# Patient Record
Sex: MALE | Race: WHITE | Employment: FULL TIME
[De-identification: names, ages, dates, MRNs, and addresses within clinical notes are randomized per-mention and may not be internally consistent; named-entity substitution may affect disease eponyms.]

---

## 2017-06-17 ENCOUNTER — HEALTH MAINTENANCE LETTER (OUTPATIENT)
Age: 22
End: 2017-06-17

## 2018-08-25 ENCOUNTER — HEALTH MAINTENANCE LETTER (OUTPATIENT)
Age: 23
End: 2018-08-25

## 2020-04-14 ENCOUNTER — TELEPHONE (OUTPATIENT)
Dept: BEHAVIORAL HEALTH | Facility: CLINIC | Age: 25
End: 2020-04-14

## 2020-04-14 ENCOUNTER — HOSPITAL ENCOUNTER (INPATIENT)
Facility: CLINIC | Age: 25
LOS: 1 days | Discharge: HOME | End: 2020-04-15
Attending: EMERGENCY MEDICINE | Admitting: PSYCHIATRY & NEUROLOGY
Payer: COMMERCIAL

## 2020-04-14 DIAGNOSIS — F30.9 MANIA (H): ICD-10-CM

## 2020-04-14 DIAGNOSIS — F29 PSYCHOSIS, UNSPECIFIED PSYCHOSIS TYPE (H): Primary | ICD-10-CM

## 2020-04-14 LAB
ALBUMIN SERPL-MCNC: 4.5 G/DL (ref 3.4–5)
ALP SERPL-CCNC: 62 U/L (ref 40–150)
ALT SERPL W P-5'-P-CCNC: 24 U/L (ref 0–70)
AMPHETAMINES UR QL SCN: NEGATIVE
ANION GAP SERPL CALCULATED.3IONS-SCNC: 6 MMOL/L (ref 3–14)
APAP SERPL-MCNC: <2 MG/L (ref 10–20)
AST SERPL W P-5'-P-CCNC: 19 U/L (ref 0–45)
BARBITURATES UR QL: NEGATIVE
BASOPHILS # BLD AUTO: 0 10E9/L (ref 0–0.2)
BASOPHILS NFR BLD AUTO: 0.2 %
BENZODIAZ UR QL: NEGATIVE
BILIRUB SERPL-MCNC: 0.6 MG/DL (ref 0.2–1.3)
BUN SERPL-MCNC: 17 MG/DL (ref 7–30)
CALCIUM SERPL-MCNC: 9.4 MG/DL (ref 8.5–10.1)
CANNABINOIDS UR QL SCN: POSITIVE
CHLORIDE SERPL-SCNC: 109 MMOL/L (ref 94–109)
CO2 SERPL-SCNC: 26 MMOL/L (ref 20–32)
COCAINE UR QL: NEGATIVE
CREAT SERPL-MCNC: 1 MG/DL (ref 0.66–1.25)
DIFFERENTIAL METHOD BLD: ABNORMAL
EOSINOPHIL # BLD AUTO: 0 10E9/L (ref 0–0.7)
EOSINOPHIL NFR BLD AUTO: 0.2 %
ERYTHROCYTE [DISTWIDTH] IN BLOOD BY AUTOMATED COUNT: 12.6 % (ref 10–15)
GFR SERPL CREATININE-BSD FRML MDRD: 79 ML/MIN/{1.73_M2}
GLUCOSE SERPL-MCNC: 94 MG/DL (ref 70–99)
HCT VFR BLD AUTO: 40 % (ref 40–53)
HGB BLD-MCNC: 13.9 G/DL (ref 13.3–17.7)
IMM GRANULOCYTES # BLD: 0 10E9/L (ref 0–0.4)
IMM GRANULOCYTES NFR BLD: 0.2 %
LYMPHOCYTES # BLD AUTO: 2.3 10E9/L (ref 0.8–5.3)
LYMPHOCYTES NFR BLD AUTO: 18.8 %
MCH RBC QN AUTO: 28.5 PG (ref 26.5–33)
MCHC RBC AUTO-ENTMCNC: 34.8 G/DL (ref 31.5–36.5)
MCV RBC AUTO: 82 FL (ref 78–100)
MONOCYTES # BLD AUTO: 1.3 10E9/L (ref 0–1.3)
MONOCYTES NFR BLD AUTO: 10.9 %
NEUTROPHILS # BLD AUTO: 8.5 10E9/L (ref 1.6–8.3)
NEUTROPHILS NFR BLD AUTO: 69.7 %
NRBC # BLD AUTO: 0 10*3/UL
NRBC BLD AUTO-RTO: 0 /100
OPIATES UR QL SCN: NEGATIVE
PCP UR QL SCN: NEGATIVE
PLATELET # BLD AUTO: 268 10E9/L (ref 150–450)
POTASSIUM SERPL-SCNC: 3.6 MMOL/L (ref 3.4–5.3)
PROT SERPL-MCNC: 7.6 G/DL (ref 6.8–8.8)
RBC # BLD AUTO: 4.88 10E12/L (ref 4.4–5.9)
SODIUM SERPL-SCNC: 141 MMOL/L (ref 133–144)
WBC # BLD AUTO: 12.2 10E9/L (ref 4–11)

## 2020-04-14 PROCEDURE — 90791 PSYCH DIAGNOSTIC EVALUATION: CPT

## 2020-04-14 PROCEDURE — 80329 ANALGESICS NON-OPIOID 1 OR 2: CPT | Performed by: EMERGENCY MEDICINE

## 2020-04-14 PROCEDURE — 80053 COMPREHEN METABOLIC PANEL: CPT | Performed by: EMERGENCY MEDICINE

## 2020-04-14 PROCEDURE — 85025 COMPLETE CBC W/AUTO DIFF WBC: CPT | Performed by: EMERGENCY MEDICINE

## 2020-04-14 PROCEDURE — 99285 EMERGENCY DEPT VISIT HI MDM: CPT | Mod: 25

## 2020-04-14 PROCEDURE — 25000132 ZZH RX MED GY IP 250 OP 250 PS 637: Performed by: EMERGENCY MEDICINE

## 2020-04-14 PROCEDURE — 80307 DRUG TEST PRSMV CHEM ANLYZR: CPT | Performed by: EMERGENCY MEDICINE

## 2020-04-14 PROCEDURE — 84443 ASSAY THYROID STIM HORMONE: CPT | Performed by: EMERGENCY MEDICINE

## 2020-04-14 RX ORDER — OLANZAPINE 10 MG/1
10 TABLET, ORALLY DISINTEGRATING ORAL ONCE
Status: COMPLETED | OUTPATIENT
Start: 2020-04-14 | End: 2020-04-14

## 2020-04-14 RX ORDER — GABAPENTIN 100 MG/1
100 CAPSULE ORAL 3 TIMES DAILY
Status: ON HOLD | COMMUNITY
End: 2020-04-15

## 2020-04-14 RX ADMIN — OLANZAPINE 10 MG: 10 TABLET, ORALLY DISINTEGRATING ORAL at 22:10

## 2020-04-15 VITALS
SYSTOLIC BLOOD PRESSURE: 120 MMHG | OXYGEN SATURATION: 100 % | DIASTOLIC BLOOD PRESSURE: 76 MMHG | HEART RATE: 83 BPM | TEMPERATURE: 97.6 F | RESPIRATION RATE: 18 BRPM

## 2020-04-15 PROBLEM — F29 PSYCHOSIS (H): Status: ACTIVE | Noted: 2020-04-15

## 2020-04-15 LAB — TSH SERPL DL<=0.005 MIU/L-ACNC: 0.43 MU/L (ref 0.4–4)

## 2020-04-15 PROCEDURE — 25000132 ZZH RX MED GY IP 250 OP 250 PS 637: Performed by: PSYCHIATRY & NEUROLOGY

## 2020-04-15 PROCEDURE — 99207 ZZC CDG-MDM COMPONENT: MEETS LOW - DOWN CODED: CPT | Performed by: PSYCHIATRY & NEUROLOGY

## 2020-04-15 PROCEDURE — 99235 HOSP IP/OBS SAME DATE MOD 70: CPT | Performed by: PSYCHIATRY & NEUROLOGY

## 2020-04-15 PROCEDURE — 25000132 ZZH RX MED GY IP 250 OP 250 PS 637: Performed by: EMERGENCY MEDICINE

## 2020-04-15 PROCEDURE — 99222 1ST HOSP IP/OBS MODERATE 55: CPT | Performed by: NURSE PRACTITIONER

## 2020-04-15 PROCEDURE — 12400000 ZZH R&B MH

## 2020-04-15 RX ORDER — OLANZAPINE 10 MG/1
10 TABLET, ORALLY DISINTEGRATING ORAL ONCE
Status: COMPLETED | OUTPATIENT
Start: 2020-04-15 | End: 2020-04-15

## 2020-04-15 RX ORDER — ACETAMINOPHEN 325 MG/1
650 TABLET ORAL EVERY 6 HOURS PRN
Status: DISCONTINUED | OUTPATIENT
Start: 2020-04-15 | End: 2020-04-15 | Stop reason: HOSPADM

## 2020-04-15 RX ORDER — LORAZEPAM 1 MG/1
1 TABLET ORAL ONCE
Status: COMPLETED | OUTPATIENT
Start: 2020-04-15 | End: 2020-04-15

## 2020-04-15 RX ORDER — DULOXETIN HYDROCHLORIDE 20 MG/1
20 CAPSULE, DELAYED RELEASE ORAL DAILY
Status: ON HOLD | COMMUNITY
End: 2020-04-15

## 2020-04-15 RX ORDER — OLANZAPINE 10 MG/2ML
10 INJECTION, POWDER, FOR SOLUTION INTRAMUSCULAR
Status: DISCONTINUED | OUTPATIENT
Start: 2020-04-15 | End: 2020-04-15 | Stop reason: HOSPADM

## 2020-04-15 RX ORDER — QUETIAPINE FUMARATE 25 MG/1
25 TABLET, FILM COATED ORAL EVERY 4 HOURS PRN
Status: DISCONTINUED | OUTPATIENT
Start: 2020-04-15 | End: 2020-04-15 | Stop reason: HOSPADM

## 2020-04-15 RX ORDER — GABAPENTIN 300 MG/1
300 CAPSULE ORAL 2 TIMES DAILY
COMMUNITY

## 2020-04-15 RX ORDER — OLANZAPINE 10 MG/1
10 TABLET ORAL AT BEDTIME
Qty: 30 TABLET | Refills: 0 | Status: SHIPPED | OUTPATIENT
Start: 2020-04-15

## 2020-04-15 RX ORDER — OLANZAPINE 10 MG/1
10 TABLET, ORALLY DISINTEGRATING ORAL ONCE
Status: DISCONTINUED | OUTPATIENT
Start: 2020-04-15 | End: 2020-04-15 | Stop reason: HOSPADM

## 2020-04-15 RX ORDER — WATER 10 ML/10ML
INJECTION INTRAMUSCULAR; INTRAVENOUS; SUBCUTANEOUS
Status: DISCONTINUED
Start: 2020-04-15 | End: 2020-04-15 | Stop reason: WASHOUT

## 2020-04-15 RX ADMIN — LORAZEPAM 1 MG: 1 TABLET ORAL at 00:34

## 2020-04-15 RX ADMIN — OLANZAPINE 10 MG: 10 TABLET, ORALLY DISINTEGRATING ORAL at 01:32

## 2020-04-15 ASSESSMENT — ACTIVITIES OF DAILY LIVING (ADL)
DRESS: INDEPENDENT
TRANSFERRING: 0-->INDEPENDENT
BATHING: 0-->INDEPENDENT
TOILETING: 0-->INDEPENDENT
RETIRED_EATING: 0-->INDEPENDENT
ORAL_HYGIENE: INDEPENDENT
COGNITION: 0 - NO COGNITION ISSUES REPORTED
RETIRED_COMMUNICATION: 0-->UNDERSTANDS/COMMUNICATES WITHOUT DIFFICULTY
LAUNDRY: WITH SUPERVISION
SWALLOWING: 0-->SWALLOWS FOODS/LIQUIDS WITHOUT DIFFICULTY
DRESS: 0-->INDEPENDENT
HYGIENE/GROOMING: INDEPENDENT
FALL_HISTORY_WITHIN_LAST_SIX_MONTHS: NO
AMBULATION: 0-->INDEPENDENT

## 2020-04-15 ASSESSMENT — ENCOUNTER SYMPTOMS
RHINORRHEA: 0
VOMITING: 0
DIARRHEA: 0
FEVER: 0
COUGH: 0
APPETITE CHANGE: 1
AGITATION: 1
POLYDIPSIA: 0
FATIGUE: 0
SORE THROAT: 0
SHORTNESS OF BREATH: 0
CHILLS: 0

## 2020-04-15 NOTE — ED NOTES
"Per family the pt started rib pain 9 weeks ago, he was prescribed Cymbalta 20mg for nerve pain. He has no mental health history but has been participating in talk therapy. 72 hours ago saw a \"shrink\" who changed the medication, stopped it cold turkey and changed to Gabapentin 3 times a day. Tried to run away in triage. Loss of appetite, prior to sleeping 8 hours last night he has not slept in days. Aggressive outbursts which is totally abnormal for him. Smokes weed, no other drug use.  "
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
## 2020-04-15 NOTE — PLAN OF CARE
patient spent most of the shift in his room napping. His speech is normal in rate and content. His mood is a bit restless. Affect is calm. Denies SI/SIB and HI. Discharge information was given to the patient and gone over with him. Questions answered. Belongings returned to patient. He was escorted to the discharge door to meet his ride.

## 2020-04-15 NOTE — ED NOTES
"Pt refuses to take zyprexa ODT, states \"it won't help at all\", pt very belligerent with staff yelling at them, saying that he wants to leave.  Pt says that he will take oral ativan.  1mg Po ativan given.  Pt still verbally aggressive, when ativan is brought in, states that he won't take it.  Pt told that if he doesn't take pill, he will need to have an injection given.  Pt then consents to take pill.  Will continue to monitor.  "

## 2020-04-15 NOTE — PROGRESS NOTES
"Patient admitted to unit, was highly agitated. Patient refused admission vitals. Patient was pacing around, yelling, stating he was mad he was on a hold. Writer called oncall provider and got a 1 time order for zyprexa 10mg. Patient took the medication and was able to fall asleep.     Patient admitted from our ED, on 72 hour hold. Recently was started on Cymbalta 1 month and then stopped 4 days ago. Has been having difficulty sleeping. Utox was positive for cannabis.  Patient reports having racing thoughts. Patient denies SI, but was making statements when he arrived on unit that\" he would rather die then be here\".     Welcome packet reviewed with patient. Information reviewed includes getting emergency help, preventing infections, understanding your care, using medication safely, reducing falls, preventing pressure ulcers, smoking cessation, powerful choices and Patients Bill of Rights. Pt. given tour of the unit and instruction on use of facility including emergency call light. Program schedule reviewed with patient. Questions regarding the unit addressed. Pt. Search completed and belongings inventoried. Nursing assessment complete including patient and medication profiles. Risk assessments completed addressing suicide,fall,skin,nutrition and safety issues. Care plan initiated. Assessments reviewed with physician and admit orders received. Video monitoring in progress, Patient Informed.     "

## 2020-04-15 NOTE — PHARMACY-ADMISSION MEDICATION HISTORY
Pharmacy Medication History  Admission medication history interview status for the 4/14/2020  admission is complete. See EPIC admission navigator for prior to admission medications     Medication history sources: Patient, Surescripts and Care Everywhere  Medication history source reliability: Good  Adherence assessment: Moderate    Significant changes made to the medication list:  Pt states he is not on Zanaflex, Valtrex, Gabapentin 100mg, Alprazolam; also says he is not on Duloxetine but per notes his last dose was 4 days ago so added on list, recently filled 4/13      Additional medication history information:       Medication reconciliation completed by provider prior to medication history? No    Time spent in this activity: 15 min      Prior to Admission medications    Medication Sig Last Dose Taking? Auth Provider   DULoxetine (CYMBALTA) 20 MG capsule Take 20 mg by mouth daily Past Week at Unknown time Yes Unknown, Entered By History   gabapentin (NEURONTIN) 300 MG capsule Take 300 mg by mouth 2 times daily 4/14/2020 at Unknown time Yes Unknown, Entered By History

## 2020-04-15 NOTE — ED NOTES
"Spoke with pt's father over the phone, he reiterates the story about pt seeing the psychiatrist on Monday, who told him to stop taking Cymbalta. Father states ever since the rib pain started \"this has all been a nightmare\", and also reports pt smoked some pot tonight, and that pt had an outburst last week after smoking marijuana. I updated father on plan of care to be assessed by DEC, and told him I would give him a call when we have a plan.  "

## 2020-04-15 NOTE — ED NOTES
Pt requesting to leave.  States that he does not need to be here.  Nurse educates patient about need for admission to get his medications figured out.  Pt angry with this, raises his voice, and shouts that he wants to leave.  Nurse states that Md will come and explain why he needs to be admitted.  Pt agrees to this.  Nurse offers pt food, water, or a warm blanket.  Pt refuses all these.  Pt seems to be anxious at this time, asked if he wants anything to help with anxiety.  Pt refuses.  MD notified, will continue to monitor.
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
## 2020-04-15 NOTE — ED TRIAGE NOTES
Erratic behavior, possible medication reaction. Has had self harm thoughts recently. Stopped Cymbalta cold turkey been awake 3 days.

## 2020-04-15 NOTE — ED NOTES
"Pt knocking on door, RN answered and pt states \"Is my family still here? I think I'm fine\". RN told him that because he is in the emergency room, the doctor needs to see him. He verbalizes understanding.  "

## 2020-04-15 NOTE — ED NOTES
"Pt pacing in room and messing with the gurney, restless, running his hands through his hair. Pt again knocks on the door and states \"I think I was just having a bad panic attack\". Security asked pt to return to his bed until the doctor can come to see him. Pt is cooperative at this time, but restless.  "

## 2020-04-15 NOTE — DISCHARGE INSTRUCTIONS
Behavioral Discharge Planning and Instructions      Summary:  You were admitted to [address] [facility] due to mood instability and some psychotic symptoms.    Your tox screen was positive for cannabis.     You were under the care of Dr Portillo who adjusted your medication.    You are leaving the same day as being admitted to the unit - you did not wish to sign in for voluntary treatment.  Therefore, you are being discharged home today, where you [facility] ( [id number] [facility]      Diagnoses:   Unspecified mood disorder  --rule out a primary mood disorder like bipolar disorder  --rule out a medication induced mood disorder (Cymbalta)  --rule out substance induced mood disorder (cannabis)      Mental Health Follow-Up:    [facility]   Dr. Darrell Durant -  Thursday April 16th at 10:30am.  [address] [place] [address], [place] [address]    Ph:  [phone]   x2  Fx:  [phone]    --------------------------------------  You declined to sign a release of information in order for us to schedule the next appointment with your therapist.    You stated you won't go back to the same therapist and will get a new one.      Attend all scheduled appointments with your outpatient providers. Call at least 24 hours in advance if you need to reschedule an appointment to ensure continued access to your outpatient providers.     Major Treatments, Procedures and Findings:  You were provided with a psychiatric assessment, assessed for medical stability, medication evaluation and/or management and group therapy.    Symptoms to Report: feeling more aggressive, increased confusion, losing more sleep, mood getting worse or thoughts of suicide.    Early warning signs can include: increased depression or anxiety, sleep disturbances, increased thoughts or behaviors of suicide or self-harm,  increased unusual thinking such as paranoia or hearing voices.    Safety and Wellness:  Take  all medicines as directed.  Make no changes unless your doctor suggests them.   Follow treatment recommendations.  Refrain from alcohol and non-prescribed drugs.  If there is a concern for safety, call 561.    Resources:   COPE (Community Outreach for Psychiatric Emergencies): 552.261.3130.  Available 24-hours a day, 7 days a week.  COPE professionals are available to go where you ar., Telephone consultations are also available.    Crisis Intervention: 670.118.1181 or 816-563-4768 (TTY: 926.715.7374).  Call anytime for help.  [place]ness (www.[place].rosamaria.org): 701.859.7174 or 485-924-5026.  Suicide Awareness Voices of Education (SAVE) (www.save.org): 715-097-ZLXJ (9383)  National Suicide Prevention Line (www.mentalhealthmn.org): 072-909-XVTL (6679)  Mental Health Consumer/Survivor Network of [place] (www.mhcsn.net): 391.555.3791 or 950-858-3822  Mental Health Association of [place] (www.mentalhealth.org): 592.472.3552 or 338-930-8930     Thank you for choosing [facility].  The treatment team has appreciated the opportunity to work with you. If you have any questions or concerns, our unit number is [phone].

## 2020-04-15 NOTE — ED PROVIDER NOTES
This 24 year old male patient with no real psychiatric history was endorsed to me by Dr. Santoyo pending psychiatric admission for tish failing outpatient treatment strategies, describing hallucinations and paranoia. Medically cleared. On DARON. He has received 10 mg Zyprexa ODT.    I have reviewed patient's vitals, labs, and notes which are remarkable for leukocytosis to 12.2 and UDS + for cannabinoids.    Ishaan did have some increased agitation and would not take Zyprexa.  He was instead given 1 mg of oral Ativan.  Otherwise, he required no interventions including no chemical or physical restraint.  He was admitted to [facility] psychiatric service.         Belinda Ch MD  04/15/20 0547

## 2020-04-15 NOTE — ED NOTES
Pt void 1000cc straw coloured urine in urinal in pt's room.  Reports feeling way better after this.

## 2020-04-15 NOTE — PROGRESS NOTES
04/15/20 0109   Patient Belongings   Did you bring any home meds/supplements to the hospital?  No   Patient Belongings locker   Patient Belongings Put in Hospital Secure Location (Security or Locker, etc.) body jewelry;clothing;shoes   Belongings Search No   Clothing Search No   Second Staff Mame S   Comment Pt refused vitals and belonging search.     Jacket  Shoes  Sweater  Ring  Body jewelry    Admission:  I am responsible for any personal items that are not sent to the safe or pharmacy. [facility] is not responsible for loss, theft or damage of any property in my possession.        Patient Signature: ___________________________________________      Date/Time:__________________________     Staff Signature: __________________________________      Date/Time:__________________________     [facility] Staff person, if patient is unable/unwilling to sign:      __________________________________________________________      Date/Time: __________________________        Discharge:  [facility] has returned all of my personal belongings:     Patient Signature: ________________________________________     Date/Time: ____________________________________     Staff Signature: ______________________________________     Date/Time:_____________________________________

## 2020-04-15 NOTE — TELEPHONE ENCOUNTER
S: Pt is a 24 yrs old male in the [facility] ED for manic behavior, reports by Jeff at 11:05PM.      B: Pt was BIB family for manic behavior.  Pt hasn't slept in 3 days.  Pt was paranoid that people were lying to him.  He believed his roommates were up banging on his door and partying to keep him awake; then roommates denies in the morning.  Pt reports to Cannibis (Marijuana) use.  He has had no prior  hospitalization..  Pt saw his Psychiatrist and had his medications change from Cymbalta to Gabapentin 3x/day on Monday.  Pt denies SI/HI or hallucinations.  Pt is agitated in ED and can't sit still.  Pt was at [facility] last night and they diagnosed him with Anxiety and Mood d/o.  Pt is medically cleared and ambulates independently.  There are no chronic medical illness.      A: 72 hr hold.  Does not want to be there.  Tried to leave. Cooperative and calm.  Forthcoming with history.      R: Pt waiting in ED for appropriate placement.        Travel screen was completed.       Patient cleared and ready for behavioral bed placement: Yes
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
